# Patient Record
Sex: FEMALE | Race: WHITE | NOT HISPANIC OR LATINO | Employment: OTHER | URBAN - METROPOLITAN AREA
[De-identification: names, ages, dates, MRNs, and addresses within clinical notes are randomized per-mention and may not be internally consistent; named-entity substitution may affect disease eponyms.]

---

## 2017-01-06 DIAGNOSIS — B02.9 HERPES ZOSTER WITHOUT COMPLICATION: Primary | ICD-10-CM

## 2017-01-06 RX ORDER — FAMCICLOVIR 500 MG/1
500 TABLET ORAL 3 TIMES DAILY
Qty: 21 TABLET | Refills: 0 | Status: SHIPPED | OUTPATIENT
Start: 2017-01-06 | End: 2017-04-04

## 2017-01-10 ENCOUNTER — OFFICE VISIT (OUTPATIENT)
Dept: FAMILY MEDICINE CLINIC | Facility: CLINIC | Age: 82
End: 2017-01-10

## 2017-01-10 VITALS
WEIGHT: 130.6 LBS | RESPIRATION RATE: 16 BRPM | TEMPERATURE: 98.5 F | HEIGHT: 62 IN | SYSTOLIC BLOOD PRESSURE: 156 MMHG | HEART RATE: 78 BPM | DIASTOLIC BLOOD PRESSURE: 88 MMHG | OXYGEN SATURATION: 98 % | BODY MASS INDEX: 24.03 KG/M2

## 2017-01-10 DIAGNOSIS — I10 ESSENTIAL HYPERTENSION: ICD-10-CM

## 2017-01-10 DIAGNOSIS — R93.429 ABNORMAL CT SCAN, KIDNEY: ICD-10-CM

## 2017-01-10 DIAGNOSIS — B02.8 HERPES ZOSTER WITH OTHER COMPLICATION: ICD-10-CM

## 2017-01-10 DIAGNOSIS — E53.8 B12 DEFICIENCY: Primary | ICD-10-CM

## 2017-01-10 DIAGNOSIS — Z79.899 HIGH RISK MEDICATION USE: ICD-10-CM

## 2017-01-10 DIAGNOSIS — E07.9 DISEASE OF THYROID GLAND: ICD-10-CM

## 2017-01-10 DIAGNOSIS — F03.90 DEMENTIA WITHOUT BEHAVIORAL DISTURBANCE, UNSPECIFIED DEMENTIA TYPE: ICD-10-CM

## 2017-01-10 DIAGNOSIS — E04.2 GOITER, NONTOXIC, MULTINODULAR: ICD-10-CM

## 2017-01-10 DIAGNOSIS — R93.2 ABNORMAL LIVER CT: ICD-10-CM

## 2017-01-10 DIAGNOSIS — E11.9 CONTROLLED TYPE 2 DIABETES MELLITUS WITHOUT COMPLICATION, WITHOUT LONG-TERM CURRENT USE OF INSULIN (HCC): ICD-10-CM

## 2017-01-10 PROCEDURE — 99214 OFFICE O/P EST MOD 30 MIN: CPT | Performed by: FAMILY MEDICINE

## 2017-01-10 RX ORDER — GABAPENTIN 100 MG/1
100 CAPSULE ORAL 3 TIMES DAILY
Qty: 90 CAPSULE | Refills: 1 | Status: SHIPPED | OUTPATIENT
Start: 2017-01-10 | End: 2017-01-18 | Stop reason: SDUPTHER

## 2017-01-10 RX ORDER — ACYCLOVIR 800 MG/1
TABLET ORAL
COMMUNITY
Start: 2017-01-04 | End: 2017-01-10 | Stop reason: ALTCHOICE

## 2017-01-10 RX ORDER — TRIAMCINOLONE ACETONIDE 0.25 MG/G
OINTMENT TOPICAL 2 TIMES DAILY
Qty: 80 G | Refills: 2 | Status: SHIPPED | OUTPATIENT
Start: 2017-01-10 | End: 2017-07-12

## 2017-01-10 RX ORDER — HYDROCODONE BITARTRATE AND ACETAMINOPHEN 5; 325 MG/1; MG/1
TABLET ORAL
COMMUNITY
Start: 2017-01-04 | End: 2017-01-18 | Stop reason: SDUPTHER

## 2017-01-10 NOTE — PROGRESS NOTES
HPI  Rose Degroot is a 81 y.o. female who is here for follow up especially after recent emergency room visit for shingles affecting her left lower trunk.  She was seen at [facility] emergency room and apparently treated for dehydration with IV fluids and sent home with a prescription for acyclovir.  Previously talked to her son in [place] and patient has obvious progressive dementia and even according to her friend she frequently forgets to take her medications etc.  Unfortunately information from [facility] is somewhat difficult to find in our current computer system though is probably available somewhere.  Was able to find CT scan results that did report some abnormalities in both the liver and kidneys possibly cysts.  Contrast enhanced CT scans or ultrasounds recommended.  Unfortunately difficult to verify if patient has remained on current medications.  Grandson apparently had recently been living with her but is unclear if he will be moving out.  At some point her son may be having removed to [place] and is looking for an assisted living situation.  Unfortunately cannot really locate emergency room lab tests so will retest especially since now has rehydrated.  Her friend reports significant pain related to her shingles and will start low-dose gabapentin and follow closely.  Also steroidal ointment for the very dry rash will be started.      Review of Systems   Constitutional: Positive for appetite change and chills.   Respiratory: Positive for shortness of breath.    Cardiovascular: Positive for chest pain.   Endocrine: Positive for polydipsia.   Musculoskeletal: Positive for back pain and myalgias.   Skin: Positive for rash.   Neurological: Positive for dizziness.         No past medical history on file.    No past surgical history on file.    No family history on file.    Social History     Social History   • Marital status:      Spouse name: N/A   • Number of children: N/A   • Years of education: N/A      Occupational History   • Not on file.     Social History Main Topics   • Smoking status: Not on file   • Smokeless tobacco: Not on file   • Alcohol use Not on file   • Drug use: Not on file   • Sexual activity: Not on file     Other Topics Concern   • Not on file     Social History Narrative         Physical Exam   Constitutional: She is oriented to person, place, and time. She appears well-developed and well-nourished. No distress.   HENT:   Head: Normocephalic.   Mouth/Throat: Oropharynx is clear and moist.   Eyes: Conjunctivae are normal. Pupils are equal, round, and reactive to light.   Neck: Neck supple. Thyromegaly present.   Cardiovascular: Normal rate and regular rhythm.    Pulmonary/Chest: Effort normal and breath sounds normal.   Abdominal: Soft. She exhibits no distension and no mass. There is no tenderness.   Neurological: She is alert and oriented to person, place, and time.   Skin: Skin is warm and dry. Rash noted.        Psychiatric: She has a normal mood and affect. Her speech is normal and behavior is normal. She is not actively hallucinating. Cognition and memory are impaired.   Nursing note and vitals reviewed.        Assessment/Plan    Rose was seen today for herpes zoster.    Diagnoses and all orders for this visit:    B12 deficiency  -     CBC & Differential  -     Vitamin B12    Essential hypertension  -     Comprehensive Metabolic Panel    Disease of thyroid gland  -     TSH+Free T4    Controlled type 2 diabetes mellitus without complication, without long-term current use of insulin  -     Hemoglobin A1c    High risk medication use  -     Comprehensive Metabolic Panel    Goiter, nontoxic, multinodular    Abnormal liver CT  -     CT Abdomen Pelvis With Contrast; Future    Abnormal CT scan, kidney  -     CT Abdomen Pelvis With Contrast; Future    Dementia without behavioral disturbance, unspecified dementia type    Herpes zoster with other complication  -     gabapentin (NEURONTIN) 100 MG  capsule; Take 1 capsule by mouth 3 (Three) Times a Day.  -     triamcinolone (KENALOG) 0.025 % ointment; Apply  topically 2 (Two) Times a Day.     patient is mostly here because of recent emergency room visit for acute shingles outbreak.  Patient seems in no distress and denies complaint but her friend says she complains of significant pain at her home?  Multiple ongoing medical issues complicated type patient's apparent slowly progressive dementia.  It is unclear exactly what medication patient is taking at this time.  She most likely is reaching a point where she needs some assistance with her living but she is very resistant to any changes.  Will get baseline lab as noted.  Medication for her shingles as noted above.  Follow-up CT scan of abdomen and pelvis as recommended by emergency room non-contrast CT showing liver and kidney abnormalities?  Friend is to try to help clarify current medications.  Reports poor appetite and somewhat concerned about continuing Amaryl related to risks of low blood sugars?  Will await lab etc.    This note includes information entered using a voice recognition dictation system.  Though reviewed, some nonsensible errors may remain.

## 2017-01-10 NOTE — MR AVS SNAPSHOT
Rose Degroot   1/10/2017 2:40 PM   Office Visit    Dept Phone:  [phone]   Encounter #:  [id number]    Provider:  Jacob Bains MD   Department:  [facility]                Your Full Care Plan              Today's Medication Changes          These changes are accurate as of: 1/10/17  3:36 PM.  If you have any questions, ask your nurse or doctor.               Stop taking medication(s)listed here:     acyclovir 800 MG tablet   Commonly known as:  ZOVIRAX   Stopped by:  Jacob Bains MD                      Your Updated Medication List          This list is accurate as of: 1/10/17  3:36 PM.  Always use your most recent med list.                B-12 1000 MCG capsule       CALCIUM 500 + D 500-125 MG-UNIT tablet   Generic drug:  Calcium Carbonate-Vitamin D       famciclovir 500 MG tablet   Commonly known as:  FAMVIR   Take 1 tablet by mouth 3 (Three) Times a Day.       fish oil 1000 MG capsule capsule       glimepiride 2 MG tablet   Commonly known as:  AMARYL   Take 1 tablet by mouth Daily.       HYDROcodone-acetaminophen 5-325 MG per tablet   Commonly known as:  NORCO       lisinopril 20 MG tablet   Commonly known as:  PRINIVIL,ZESTRIL   Take 1 tablet by mouth daily.       MULTI VITAMIN DAILY PO       simvastatin 40 MG tablet   Commonly known as:  ZOCOR   Take 1 tablet by mouth daily.               We Performed the Following     CBC & Differential     Comprehensive Metabolic Panel     Hemoglobin A1c     TSH+Free T4     Vitamin B12       You Were Diagnosed With        Codes Comments    B12 deficiency    -  Primary ICD-10-CM: E53.8  ICD-9-CM: 266.2     Essential hypertension     ICD-10-CM: I10  ICD-9-CM: 401.9     Disease of thyroid gland     ICD-10-CM: E07.9  ICD-9-CM: 246.9     Controlled type 2 diabetes mellitus without complication, without long-term current use of insulin     ICD-10-CM: E11.9  ICD-9-CM: 250.00     High risk medication use     " ICD-10-CM: Z79.899  ICD-9-CM: V58.69     Goiter, nontoxic, multinodular     ICD-10-CM: E04.2  ICD-9-CM: 241.1     Abnormal liver CT     ICD-10-CM: R93.2  ICD-9-CM: 793.3       Instructions     None    Patient Instructions History      Upcoming Appointments     Visit Type Date Time Department    OFFICE VISIT 1/10/2017  2:40 PM [facility]    OFFICE VISIT 2017 10:20 AM [facility] Signup     [email or web address] allows you to send messages to your doctor, view your test results, renew your prescriptions, schedule appointments, and more. To sign up, go to [employer] and click on the Sign Up Now link in the New User? box. Enter your [employer] Activation Code exactly as it appears below along with the last four digits of your Social Security Number and your Date of Birth () to complete the sign-up process. If you do not sign up before the expiration date, you must request a new code.    [employer] Activation Code: [email or web address]  Expires: 2017  3:35 PM    If you have questions, you can email [email or web address] or call [phone] to talk to our [employer] staff. Remember, [employer] is NOT to be used for urgent needs. For medical emergencies, dial 911.               Other Info from Your Visit           Your Appointments     2017 10:20 AM EST   Office Visit with Jacob Bains MD   [facility] (--)    [address] [place] [id number] [phone]           Arrive 15 minutes prior to appointment.              Allergies     Sulfa Antibiotics        Reason for Visit     Herpes Zoster dx shingles R side of abdomin at [facility] last week.      Vital Signs     Blood Pressure Pulse Temperature Respirations Height Weight    156/88 78 98.5 °F (36.9 °C) 16 62\" (157.5 cm) 130 lb 9.6 oz (59.2 kg)    Oxygen Saturation Body Mass Index                98% 23.89 kg/m2          Problems and Diagnoses " Noted     Vitamin B12 deficiency    High blood pressure    Disease of thyroid gland    Goiter, nontoxic, multinodular    Controlled type 2 diabetes mellitus without complication, without long-term current use of insulin        High risk medication use        Abnormal liver CT

## 2017-01-11 LAB
ALBUMIN SERPL-MCNC: 4.3 G/DL (ref 3.5–4.8)
ALBUMIN/GLOB SERPL: 1.2 {RATIO} (ref 1.1–2.5)
ALP SERPL-CCNC: 62 IU/L (ref 39–117)
ALT SERPL-CCNC: 20 IU/L (ref 0–32)
AST SERPL-CCNC: 27 IU/L (ref 0–40)
BASOPHILS # BLD AUTO: 0 X10E3/UL (ref 0–0.2)
BASOPHILS NFR BLD AUTO: 0 %
BILIRUB SERPL-MCNC: 0.5 MG/DL (ref 0–1.2)
BUN SERPL-MCNC: 15 MG/DL (ref 8–27)
BUN/CREAT SERPL: 21 (ref 11–26)
CALCIUM SERPL-MCNC: 9.1 MG/DL (ref 8.7–10.3)
CHLORIDE SERPL-SCNC: 102 MMOL/L (ref 96–106)
CO2 SERPL-SCNC: 25 MMOL/L (ref 18–29)
CREAT SERPL-MCNC: 0.72 MG/DL (ref 0.57–1)
EOSINOPHIL # BLD AUTO: 0.1 X10E3/UL (ref 0–0.4)
EOSINOPHIL NFR BLD AUTO: 1 %
ERYTHROCYTE [DISTWIDTH] IN BLOOD BY AUTOMATED COUNT: 13.5 % (ref 12.3–15.4)
GLOBULIN SER CALC-MCNC: 3.6 G/DL (ref 1.5–4.5)
GLUCOSE SERPL-MCNC: 139 MG/DL (ref 65–99)
HBA1C MFR BLD: 6.8 % (ref 4.8–5.6)
HCT VFR BLD AUTO: 35.9 % (ref 34–46.6)
HGB BLD-MCNC: 12.2 G/DL (ref 11.1–15.9)
IMM GRANULOCYTES # BLD: 0 X10E3/UL (ref 0–0.1)
IMM GRANULOCYTES NFR BLD: 0 %
LYMPHOCYTES # BLD AUTO: 2 X10E3/UL (ref 0.7–3.1)
LYMPHOCYTES NFR BLD AUTO: 36 %
MCH RBC QN AUTO: 29.1 PG (ref 26.6–33)
MCHC RBC AUTO-ENTMCNC: 34 G/DL (ref 31.5–35.7)
MCV RBC AUTO: 86 FL (ref 79–97)
MONOCYTES # BLD AUTO: 0.3 X10E3/UL (ref 0.1–0.9)
MONOCYTES NFR BLD AUTO: 5 %
NEUTROPHILS # BLD AUTO: 3.1 X10E3/UL (ref 1.4–7)
NEUTROPHILS NFR BLD AUTO: 58 %
PLATELET # BLD AUTO: 331 X10E3/UL (ref 150–379)
POTASSIUM SERPL-SCNC: 5.1 MMOL/L (ref 3.5–5.2)
PROT SERPL-MCNC: 7.9 G/DL (ref 6–8.5)
RBC # BLD AUTO: 4.19 X10E6/UL (ref 3.77–5.28)
SODIUM SERPL-SCNC: 141 MMOL/L (ref 134–144)
T4 FREE SERPL-MCNC: 1.75 NG/DL (ref 0.82–1.77)
TSH SERPL DL<=0.005 MIU/L-ACNC: 0.85 UIU/ML (ref 0.45–4.5)
VIT B12 SERPL-MCNC: 696 PG/ML (ref 211–946)
WBC # BLD AUTO: 5.5 X10E3/UL (ref 3.4–10.8)

## 2017-01-12 RX ORDER — LISINOPRIL 20 MG/1
20 TABLET ORAL DAILY
Qty: 30 TABLET | Refills: 5 | Status: SHIPPED | OUTPATIENT
Start: 2017-01-12 | End: 2017-07-12 | Stop reason: SDUPTHER

## 2017-01-17 ENCOUNTER — TELEPHONE (OUTPATIENT)
Dept: FAMILY MEDICINE CLINIC | Facility: CLINIC | Age: 82
End: 2017-01-17

## 2017-01-18 DIAGNOSIS — B02.8 HERPES ZOSTER WITH OTHER COMPLICATION: ICD-10-CM

## 2017-01-18 RX ORDER — GABAPENTIN 100 MG/1
300 CAPSULE ORAL 3 TIMES DAILY
Qty: 90 CAPSULE | Refills: 1
Start: 2017-01-18 | End: 2017-03-08 | Stop reason: SDUPTHER

## 2017-01-18 RX ORDER — HYDROCODONE BITARTRATE AND ACETAMINOPHEN 5; 325 MG/1; MG/1
1 TABLET ORAL EVERY 4 HOURS PRN
Qty: 50 TABLET | Refills: 0 | Status: SHIPPED | OUTPATIENT
Start: 2017-01-18 | End: 2017-02-14

## 2017-01-25 DIAGNOSIS — B02.29 POSTHERPETIC NEURALGIA: Primary | ICD-10-CM

## 2017-01-25 RX ORDER — AMITRIPTYLINE HYDROCHLORIDE 10 MG/1
10 TABLET, FILM COATED ORAL NIGHTLY
Qty: 30 TABLET | Refills: 2 | Status: SHIPPED | OUTPATIENT
Start: 2017-01-25 | End: 2019-05-24

## 2017-01-26 ENCOUNTER — TELEPHONE (OUTPATIENT)
Dept: FAMILY MEDICINE CLINIC | Facility: CLINIC | Age: 82
End: 2017-01-26

## 2017-01-26 NOTE — TELEPHONE ENCOUNTER
----- Message from Jacob Bains MD sent at 1/26/2017  1:24 PM EST -----  Left message for patient that attempted to call back yesterday but apparently was given the wrong phone number.  Ended up talking to the patient and informed her I would send a prescription to her pharmacy last night.  Left message at the number below that prescription was sent last night for a different medication for the pain as well as a cream to maybe help with her persistent pain.  ----- Message -----     From: Daiana Sahu     Sent: 1/26/2017   9:58 AM       To: Jacob Bains MD    Patient's son said he called yesterday but i do not see a message. He said she just took her last Gabapentin, do you want her to continue? If so, please call into local pharm    Toi [phone]

## 2017-02-14 ENCOUNTER — OFFICE VISIT (OUTPATIENT)
Dept: FAMILY MEDICINE CLINIC | Facility: CLINIC | Age: 82
End: 2017-02-14

## 2017-02-14 VITALS
SYSTOLIC BLOOD PRESSURE: 160 MMHG | BODY MASS INDEX: 23.96 KG/M2 | TEMPERATURE: 97.9 F | HEART RATE: 79 BPM | RESPIRATION RATE: 16 BRPM | HEIGHT: 62 IN | OXYGEN SATURATION: 99 % | DIASTOLIC BLOOD PRESSURE: 92 MMHG | WEIGHT: 130.2 LBS

## 2017-02-14 DIAGNOSIS — I10 ESSENTIAL HYPERTENSION: ICD-10-CM

## 2017-02-14 DIAGNOSIS — B02.9 HERPES ZOSTER WITHOUT COMPLICATION: ICD-10-CM

## 2017-02-14 DIAGNOSIS — F02.80 LATE ONSET ALZHEIMER'S DISEASE WITHOUT BEHAVIORAL DISTURBANCE (HCC): Primary | ICD-10-CM

## 2017-02-14 DIAGNOSIS — G30.1 LATE ONSET ALZHEIMER'S DISEASE WITHOUT BEHAVIORAL DISTURBANCE (HCC): Primary | ICD-10-CM

## 2017-02-14 PROCEDURE — 99214 OFFICE O/P EST MOD 30 MIN: CPT | Performed by: FAMILY MEDICINE

## 2017-02-14 RX ORDER — DONEPEZIL HYDROCHLORIDE 5 MG/1
5 TABLET, FILM COATED ORAL NIGHTLY
Qty: 30 TABLET | Refills: 0 | Status: SHIPPED | OUTPATIENT
Start: 2017-02-14 | End: 2017-04-04 | Stop reason: SDUPTHER

## 2017-02-14 NOTE — PROGRESS NOTES
HPI  Rose Degroot is a 81 y.o. female who is here for follow up especially after recent episode of shingles.  Her Alzheimer's continues to progress and currently grandson lives with her to try to help with her care.  Her son lives in [place] and at one point was trying to get her transferred to an Alzheimer's unit there.  Patient has obvious short-term memory deficit telling me the same fax multiple times.  According to the grandson she is fairly functional at home with his assistance.  She wants to drive but grandson indicates she has gotten lost several times.      Review of Systems   Constitutional: Positive for activity change, chills and fatigue.   HENT: Positive for hearing loss.    Gastrointestinal: Positive for abdominal pain.   Musculoskeletal: Positive for back pain.   Skin: Positive for rash.   Neurological: Positive for dizziness, weakness and light-headedness.   Psychiatric/Behavioral: Positive for agitation, confusion, decreased concentration, dysphoric mood and sleep disturbance. The patient is nervous/anxious.    All other systems reviewed and are negative.        No past medical history on file.    No past surgical history on file.    No family history on file.    Social History     Social History   • Marital status:      Spouse name: N/A   • Number of children: N/A   • Years of education: N/A     Occupational History   • Not on file.     Social History Main Topics   • Smoking status: Not on file   • Smokeless tobacco: Not on file   • Alcohol use Not on file   • Drug use: Not on file   • Sexual activity: Not on file     Other Topics Concern   • Not on file     Social History Narrative         Physical Exam   Constitutional: She is oriented to person, place, and time. She appears well-developed and well-nourished. No distress.   HENT:   Head: Normocephalic.   Mouth/Throat: Oropharynx is clear and moist.   Eyes: Conjunctivae are normal. Pupils are equal, round, and reactive to light.   Neck:  Neck supple. No thyromegaly present.   Cardiovascular: Normal rate and regular rhythm.    Pulmonary/Chest: Effort normal. No respiratory distress.   Abdominal: Soft. She exhibits no distension.   Musculoskeletal: She exhibits no edema or deformity.   Neurological: She is alert and oriented to person, place, and time. She exhibits normal muscle tone. Coordination normal.   Skin: Skin is warm and dry. Rash noted.        Nursing note and vitals reviewed.        Assessment/Plan    Rose was seen today for herpes zoster and dementia.    Diagnoses and all orders for this visit:    Late onset Alzheimer's disease without behavioral disturbance    Essential hypertension    Herpes zoster without complication    Other orders  -     donepezil (ARICEPT) 5 MG tablet; Take 1 tablet by mouth Every Night.      Patient here for recent outbreak of shingles which seems to be resolving and grandson says for first time reported aching but no significant pain.  Major concern is patient's obviously progressive dementia with extreme short-term memory loss.  For now grandson is living at her house help with her care.  Discussed treatment options specifically medication may slow progression.  Will start Aricept and recheck in one month to increase dose as tolerated.  Blood pressure remains borderline.  Recent lab tests obtained and all reviewed, unremarkable.    This note includes information entered using a voice recognition dictation system.  Though reviewed, some nonsensible errors may remain.

## 2017-03-08 DIAGNOSIS — B02.8 HERPES ZOSTER WITH OTHER COMPLICATION: ICD-10-CM

## 2017-03-08 RX ORDER — GABAPENTIN 100 MG/1
300 CAPSULE ORAL 3 TIMES DAILY
Qty: 90 CAPSULE | Refills: 1
Start: 2017-03-08 | End: 2017-07-12

## 2017-04-04 ENCOUNTER — OFFICE VISIT (OUTPATIENT)
Dept: FAMILY MEDICINE CLINIC | Facility: CLINIC | Age: 82
End: 2017-04-04

## 2017-04-04 VITALS
DIASTOLIC BLOOD PRESSURE: 76 MMHG | TEMPERATURE: 97.7 F | SYSTOLIC BLOOD PRESSURE: 134 MMHG | HEIGHT: 62 IN | WEIGHT: 126 LBS | OXYGEN SATURATION: 99 % | HEART RATE: 85 BPM | BODY MASS INDEX: 23.19 KG/M2

## 2017-04-04 DIAGNOSIS — G30.1 LATE ONSET ALZHEIMER'S DISEASE WITHOUT BEHAVIORAL DISTURBANCE (HCC): ICD-10-CM

## 2017-04-04 DIAGNOSIS — I10 ESSENTIAL HYPERTENSION: Primary | ICD-10-CM

## 2017-04-04 DIAGNOSIS — E04.2 GOITER, NONTOXIC, MULTINODULAR: ICD-10-CM

## 2017-04-04 DIAGNOSIS — E07.9 DISEASE OF THYROID GLAND: ICD-10-CM

## 2017-04-04 DIAGNOSIS — E78.5 HYPERLIPIDEMIA, UNSPECIFIED HYPERLIPIDEMIA TYPE: ICD-10-CM

## 2017-04-04 DIAGNOSIS — M54.16 LUMBAR RADICULOPATHY: ICD-10-CM

## 2017-04-04 DIAGNOSIS — E53.8 B12 DEFICIENCY: ICD-10-CM

## 2017-04-04 DIAGNOSIS — F02.80 LATE ONSET ALZHEIMER'S DISEASE WITHOUT BEHAVIORAL DISTURBANCE (HCC): ICD-10-CM

## 2017-04-04 PROCEDURE — 99213 OFFICE O/P EST LOW 20 MIN: CPT | Performed by: FAMILY MEDICINE

## 2017-04-04 RX ORDER — DONEPEZIL HYDROCHLORIDE 10 MG/1
10 TABLET, FILM COATED ORAL NIGHTLY
Qty: 30 TABLET | Refills: 5 | Status: SHIPPED | OUTPATIENT
Start: 2017-04-04 | End: 2017-07-12 | Stop reason: SDUPTHER

## 2017-04-04 NOTE — PROGRESS NOTES
MICHELE Degroot is a 81 y.o. female who is here for follow up of recent episode of shingles.  Rash has totally resolved and some hyperpigmented areas noted only.  Though difficult to evaluate seems that pain has significantly decreased.  Patient has obvious dementia repeating same statements several times during the visit.  Does report living at home with a grandson.  No longer drives.  Seems to be making rational decisions.  Reports does need refill on medications.  Assume remains on Aricept which will be continued though increase dose to 10 mg.      Review of Systems   Constitutional: Positive for fatigue.   HENT: Positive for hearing loss.    Musculoskeletal: Positive for back pain.   Psychiatric/Behavioral: Positive for decreased concentration.   All other systems reviewed and are negative.        No past medical history on file.    No past surgical history on file.    No family history on file.    Social History     Social History   • Marital status:      Spouse name: N/A   • Number of children: N/A   • Years of education: N/A     Occupational History   • Not on file.     Social History Main Topics   • Smoking status: Not on file   • Smokeless tobacco: Not on file   • Alcohol use Not on file   • Drug use: Not on file   • Sexual activity: Not on file     Other Topics Concern   • Not on file     Social History Narrative         Physical Exam   Constitutional: She is oriented to person, place, and time. She appears well-developed and well-nourished.   HENT:   Head: Normocephalic.   Nose: Nose normal.   Mouth/Throat: Oropharynx is clear and moist.   Eyes: Conjunctivae are normal. Pupils are equal, round, and reactive to light.   Neck: Neck supple. Thyromegaly present.   Cardiovascular: Regular rhythm.    Pulmonary/Chest: Effort normal. No respiratory distress. She has no wheezes.   Abdominal: Soft. She exhibits no distension. There is no tenderness.   Musculoskeletal: She exhibits no edema or deformity.    Neurological: She is alert and oriented to person, place, and time. She exhibits normal muscle tone. Coordination normal.   Skin: Skin is warm and dry.   Hyperpigmented areas remain from previous shingles   Psychiatric: She has a normal mood and affect. Her speech is normal and behavior is normal. She is not actively hallucinating. Cognition and memory are impaired. She exhibits abnormal recent memory. She is attentive.   Nursing note and vitals reviewed.        Assessment/Plan    Rose was seen today for follow-up.    Diagnoses and all orders for this visit:    Essential hypertension    Hyperlipidemia, unspecified hyperlipidemia type    Goiter, nontoxic, multinodular    Disease of thyroid gland    B12 deficiency    Late onset Alzheimer's disease without behavioral disturbance    Lumbar radiculopathy    Other orders  -     donepezil (ARICEPT) 10 MG tablet; Take 1 tablet by mouth Every Night.          Patient is here especially for follow-up of Alzheimer's dementia.  Overall status seems to be fairly stable on current regimen.  Will increase Aricept to 10 mg at bedtime and follow-up in 3 months.  Also continue current medication for hypertension, diabetes and thyroid disorder.  Recheck in 3 months or as needed.    This note includes information entered using a voice recognition dictation system.  Though reviewed, some nonsensible errors may remain.

## 2017-07-12 ENCOUNTER — OFFICE VISIT (OUTPATIENT)
Dept: FAMILY MEDICINE CLINIC | Facility: CLINIC | Age: 82
End: 2017-07-12

## 2017-07-12 VITALS
HEIGHT: 62 IN | DIASTOLIC BLOOD PRESSURE: 82 MMHG | BODY MASS INDEX: 23.19 KG/M2 | OXYGEN SATURATION: 98 % | HEART RATE: 78 BPM | TEMPERATURE: 98 F | WEIGHT: 126 LBS | SYSTOLIC BLOOD PRESSURE: 132 MMHG

## 2017-07-12 DIAGNOSIS — I10 ESSENTIAL HYPERTENSION: ICD-10-CM

## 2017-07-12 DIAGNOSIS — G30.1 LATE ONSET ALZHEIMER'S DISEASE WITHOUT BEHAVIORAL DISTURBANCE (HCC): Primary | ICD-10-CM

## 2017-07-12 DIAGNOSIS — F02.80 LATE ONSET ALZHEIMER'S DISEASE WITHOUT BEHAVIORAL DISTURBANCE (HCC): Primary | ICD-10-CM

## 2017-07-12 DIAGNOSIS — E53.8 B12 DEFICIENCY: ICD-10-CM

## 2017-07-12 DIAGNOSIS — E11.9 CONTROLLED TYPE 2 DIABETES MELLITUS WITHOUT COMPLICATION, WITHOUT LONG-TERM CURRENT USE OF INSULIN (HCC): ICD-10-CM

## 2017-07-12 DIAGNOSIS — Z79.899 HIGH RISK MEDICATION USE: ICD-10-CM

## 2017-07-12 LAB
ALBUMIN SERPL-MCNC: 4.2 G/DL (ref 3.5–5.2)
ALBUMIN/GLOB SERPL: 1.2 G/DL
ALP SERPL-CCNC: 68 U/L (ref 39–117)
ALT SERPL-CCNC: 16 U/L (ref 1–33)
AST SERPL-CCNC: 20 U/L (ref 1–32)
BASOPHILS # BLD AUTO: 0.02 10*3/MM3 (ref 0–0.2)
BASOPHILS NFR BLD AUTO: 0.3 % (ref 0–1.5)
BILIRUB SERPL-MCNC: 0.5 MG/DL (ref 0.1–1.2)
BUN SERPL-MCNC: 18 MG/DL (ref 8–23)
BUN/CREAT SERPL: 22.8 (ref 7–25)
CALCIUM SERPL-MCNC: 8.9 MG/DL (ref 8.6–10.5)
CHLORIDE SERPL-SCNC: 103 MMOL/L (ref 98–107)
CO2 SERPL-SCNC: 22.1 MMOL/L (ref 22–29)
CREAT SERPL-MCNC: 0.79 MG/DL (ref 0.57–1)
EOSINOPHIL # BLD AUTO: 0.1 10*3/MM3 (ref 0–0.7)
EOSINOPHIL NFR BLD AUTO: 1.5 % (ref 0.3–6.2)
ERYTHROCYTE [DISTWIDTH] IN BLOOD BY AUTOMATED COUNT: 13 % (ref 11.7–13)
GLOBULIN SER CALC-MCNC: 3.4 GM/DL
GLUCOSE SERPL-MCNC: 126 MG/DL (ref 65–99)
HBA1C MFR BLD: 6.16 % (ref 4.8–5.6)
HCT VFR BLD AUTO: 37.4 % (ref 35.6–45.5)
HGB BLD-MCNC: 12.6 G/DL (ref 11.9–15.5)
IMM GRANULOCYTES # BLD: 0 10*3/MM3 (ref 0–0.03)
IMM GRANULOCYTES NFR BLD: 0 % (ref 0–0.5)
LYMPHOCYTES # BLD AUTO: 1.95 10*3/MM3 (ref 0.9–4.8)
LYMPHOCYTES NFR BLD AUTO: 29.1 % (ref 19.6–45.3)
MCH RBC QN AUTO: 30 PG (ref 26.9–32)
MCHC RBC AUTO-ENTMCNC: 33.7 G/DL (ref 32.4–36.3)
MCV RBC AUTO: 89 FL (ref 80.5–98.2)
MONOCYTES # BLD AUTO: 0.33 10*3/MM3 (ref 0.2–1.2)
MONOCYTES NFR BLD AUTO: 4.9 % (ref 5–12)
NEUTROPHILS # BLD AUTO: 4.31 10*3/MM3 (ref 1.9–8.1)
NEUTROPHILS NFR BLD AUTO: 64.2 % (ref 42.7–76)
PLATELET # BLD AUTO: 257 10*3/MM3 (ref 140–500)
POTASSIUM SERPL-SCNC: 4.1 MMOL/L (ref 3.5–5.2)
PROT SERPL-MCNC: 7.6 G/DL (ref 6–8.5)
RBC # BLD AUTO: 4.2 10*6/MM3 (ref 3.9–5.2)
SODIUM SERPL-SCNC: 140 MMOL/L (ref 136–145)
WBC # BLD AUTO: 6.71 10*3/MM3 (ref 4.5–10.7)

## 2017-07-12 PROCEDURE — 99213 OFFICE O/P EST LOW 20 MIN: CPT | Performed by: FAMILY MEDICINE

## 2017-07-12 RX ORDER — DONEPEZIL HYDROCHLORIDE 10 MG/1
10 TABLET, FILM COATED ORAL NIGHTLY
Qty: 30 TABLET | Refills: 5 | Status: SHIPPED | OUTPATIENT
Start: 2017-07-12 | End: 2019-05-24

## 2017-07-12 RX ORDER — LISINOPRIL 20 MG/1
20 TABLET ORAL DAILY
Qty: 30 TABLET | Refills: 5 | Status: SHIPPED | OUTPATIENT
Start: 2017-07-12 | End: 2018-12-11 | Stop reason: ALTCHOICE

## 2017-07-12 NOTE — PROGRESS NOTES
HPI  Rose Degroot is a 81 y.o. female who is here for follow up of slowly progressive Alzheimer's dementia.  Lives with her grandson and no longer drives etc.  Patient denies any new complaints.  Grandson reports basically no significant change in status probable slow worsening especially of memory.  History of shingles and seems to finally be resolving from postherpetic neuralgia.  Discussed with grandson consideration of additional medication for Alzheimer's however benefits questionable at this time.  Will reevaluate in 3 months.      Review of Systems   Musculoskeletal: Positive for back pain.   Psychiatric/Behavioral: Positive for decreased concentration.         No past medical history on file.    No past surgical history on file.    No family history on file.    Social History     Social History   • Marital status:      Spouse name: N/A   • Number of children: N/A   • Years of education: N/A     Occupational History   • Not on file.     Social History Main Topics   • Smoking status: Not on file   • Smokeless tobacco: Not on file   • Alcohol use Not on file   • Drug use: Not on file   • Sexual activity: Not on file     Other Topics Concern   • Not on file     Social History Narrative         Physical Exam   Constitutional: She is oriented to person, place, and time. She appears well-developed and well-nourished. No distress.   HENT:   Head: Normocephalic.   Mouth/Throat: Oropharynx is clear and moist.   Eyes: Conjunctivae are normal. Pupils are equal, round, and reactive to light.   Neck: Normal range of motion. Neck supple. No JVD present. No thyromegaly present.   Cardiovascular: Normal rate, regular rhythm and normal heart sounds.    Pulmonary/Chest: Effort normal and breath sounds normal.   Abdominal: Soft. Bowel sounds are normal. There is no tenderness.   Musculoskeletal: She exhibits no edema or deformity.   Neurological: She is alert and oriented to person, place, and time. She exhibits normal  muscle tone. Coordination normal.   Skin: Skin is dry.   Psychiatric: She has a normal mood and affect. Her behavior is normal. Judgment and thought content normal.   Nursing note and vitals reviewed.        Assessment/Plan    Rose was seen today for follow-up, hypertension, alzheimer's disease and med refill.    Diagnoses and all orders for this visit:    Late onset Alzheimer's disease without behavioral disturbance    B12 deficiency  -     CBC & Differential    High risk medication use  -     CBC & Differential  -     Comprehensive Metabolic Panel    Controlled type 2 diabetes mellitus without complication, without long-term current use of insulin  -     Hemoglobin A1c    Essential hypertension    Other orders  -     lisinopril (PRINIVIL,ZESTRIL) 20 MG tablet; Take 1 tablet by mouth Daily.  -     donepezil (ARICEPT) 10 MG tablet; Take 1 tablet by mouth Every Night.      Patient here for routine follow-up of above-noted medical problems.  Overall status is very stable except for slowly progressive dementia most likely related to Alzheimer's.  Will get routine lab work as noted above.  Follow-up in 3 months and again at some point consider additional dementia medication?    This note includes information entered using a voice recognition dictation system.  Though reviewed, some nonsensible errors may remain.

## 2018-01-09 ENCOUNTER — OFFICE VISIT (OUTPATIENT)
Dept: FAMILY MEDICINE CLINIC | Facility: CLINIC | Age: 83
End: 2018-01-09

## 2018-01-09 VITALS
HEART RATE: 67 BPM | DIASTOLIC BLOOD PRESSURE: 80 MMHG | RESPIRATION RATE: 16 BRPM | SYSTOLIC BLOOD PRESSURE: 180 MMHG | HEIGHT: 62 IN | TEMPERATURE: 97.8 F | WEIGHT: 122.6 LBS | BODY MASS INDEX: 22.56 KG/M2 | OXYGEN SATURATION: 98 %

## 2018-01-09 DIAGNOSIS — I10 ESSENTIAL HYPERTENSION: Primary | ICD-10-CM

## 2018-01-09 DIAGNOSIS — F02.80 LATE ONSET ALZHEIMER'S DISEASE WITHOUT BEHAVIORAL DISTURBANCE (HCC): ICD-10-CM

## 2018-01-09 DIAGNOSIS — R73.9 HYPERGLYCEMIA: ICD-10-CM

## 2018-01-09 DIAGNOSIS — Z79.899 HIGH RISK MEDICATION USE: ICD-10-CM

## 2018-01-09 DIAGNOSIS — G30.1 LATE ONSET ALZHEIMER'S DISEASE WITHOUT BEHAVIORAL DISTURBANCE (HCC): ICD-10-CM

## 2018-01-09 DIAGNOSIS — E53.8 B12 DEFICIENCY: ICD-10-CM

## 2018-01-09 PROCEDURE — 99213 OFFICE O/P EST LOW 20 MIN: CPT | Performed by: FAMILY MEDICINE

## 2018-01-09 NOTE — PROGRESS NOTES
HPI  Rose Degroot is a 82 y.o. female who is here for follow up of hypertension and Alzheimer's dementia.  Has obvious memory impairments and apparently continues to live with grandson who helps with her care.  Asks same questions multiple times etc.  Is no longer driving and asks several time about going into the hospital and informed that I no longer would be her doctor in the hospital etc.  Recommend going to closest available hospital etc.  The pressure is slightly elevated but patient is obviously excited and expect this is a major factor.      Review of Systems   Musculoskeletal:        No longer complaining of back pain   Psychiatric/Behavioral: Positive for confusion.   All other systems reviewed and are negative.        No past medical history on file.    No past surgical history on file.    No family history on file.    Social History     Social History   • Marital status:      Spouse name: N/A   • Number of children: N/A   • Years of education: N/A     Occupational History   • Not on file.     Social History Main Topics   • Smoking status: Not on file   • Smokeless tobacco: Not on file   • Alcohol use Not on file   • Drug use: Not on file   • Sexual activity: Not on file     Other Topics Concern   • Not on file     Social History Narrative         Physical Exam   Constitutional: She is oriented to person, place, and time. She appears well-developed and well-nourished. No distress.   HENT:   Head: Normocephalic.   Mouth/Throat: Oropharynx is clear and moist.   Eyes: Conjunctivae and EOM are normal. Pupils are equal, round, and reactive to light.   Neck: Normal range of motion. Neck supple. No JVD present. No thyromegaly present.   Cardiovascular: Normal rate, regular rhythm and normal heart sounds.    Pulmonary/Chest: Effort normal. No respiratory distress. She has no wheezes. She has no rales.   Abdominal: Soft. She exhibits no distension and no mass. There is no tenderness.   Musculoskeletal: She  exhibits no edema or deformity.   Neurological: She is alert and oriented to person, place, and time. Coordination normal.   Skin: Skin is warm and dry.   Psychiatric: Her speech is normal and behavior is normal. Her mood appears anxious. She is not actively hallucinating. She exhibits abnormal recent memory. She is attentive.   Nursing note and vitals reviewed.        Assessment/Plan    Rose was seen today for hypertension.    Diagnoses and all orders for this visit:    Essential hypertension  -     Comprehensive Metabolic Panel    Late onset Alzheimer's disease without behavioral disturbance    B12 deficiency  -     CBC & Differential  -     Vitamin B12    High risk medication use  -     Comprehensive Metabolic Panel  -     CBC & Differential    Hyperglycemia  -     Hemoglobin A1c    Patient is here for routine follow-up especially of Alzheimer's dementia.  Continues with significant short-term memory deficit.  Apparently continues to live with her grandson.  Overall status seems fairly stable.  Blood pressure somewhat elevated but most likely related to general anxiety.  Overall status stable on current regimen which will be continued including follow-up every 6 months.  A1c levels have been borderline in the past and will be rechecked.    This note includes information entered using a voice recognition dictation system.  Though reviewed, some nonsensible errors may remain.

## 2018-01-10 LAB
ALBUMIN SERPL-MCNC: 4.4 G/DL (ref 3.5–5.2)
ALBUMIN/GLOB SERPL: 1.3 G/DL
ALP SERPL-CCNC: 80 U/L (ref 39–117)
ALT SERPL-CCNC: 12 U/L (ref 1–33)
AST SERPL-CCNC: 17 U/L (ref 1–32)
BASOPHILS # BLD AUTO: 0.03 10*3/MM3 (ref 0–0.2)
BASOPHILS NFR BLD AUTO: 0.6 % (ref 0–1.5)
BILIRUB SERPL-MCNC: 0.7 MG/DL (ref 0.1–1.2)
BUN SERPL-MCNC: 14 MG/DL (ref 8–23)
BUN/CREAT SERPL: 21.5 (ref 7–25)
CALCIUM SERPL-MCNC: 9.1 MG/DL (ref 8.6–10.5)
CHLORIDE SERPL-SCNC: 103 MMOL/L (ref 98–107)
CO2 SERPL-SCNC: 25.2 MMOL/L (ref 22–29)
CREAT SERPL-MCNC: 0.65 MG/DL (ref 0.57–1)
EOSINOPHIL # BLD AUTO: 0.08 10*3/MM3 (ref 0–0.7)
EOSINOPHIL NFR BLD AUTO: 1.5 % (ref 0.3–6.2)
ERYTHROCYTE [DISTWIDTH] IN BLOOD BY AUTOMATED COUNT: 13 % (ref 11.7–13)
GLOBULIN SER CALC-MCNC: 3.3 GM/DL
GLUCOSE SERPL-MCNC: 102 MG/DL (ref 65–99)
HBA1C MFR BLD: 5.89 % (ref 4.8–5.6)
HCT VFR BLD AUTO: 37.2 % (ref 35.6–45.5)
HGB BLD-MCNC: 12.4 G/DL (ref 11.9–15.5)
IMM GRANULOCYTES # BLD: 0 10*3/MM3 (ref 0–0.03)
IMM GRANULOCYTES NFR BLD: 0 % (ref 0–0.5)
LYMPHOCYTES # BLD AUTO: 1.83 10*3/MM3 (ref 0.9–4.8)
LYMPHOCYTES NFR BLD AUTO: 34.5 % (ref 19.6–45.3)
MCH RBC QN AUTO: 29.4 PG (ref 26.9–32)
MCHC RBC AUTO-ENTMCNC: 33.3 G/DL (ref 32.4–36.3)
MCV RBC AUTO: 88.2 FL (ref 80.5–98.2)
MONOCYTES # BLD AUTO: 0.33 10*3/MM3 (ref 0.2–1.2)
MONOCYTES NFR BLD AUTO: 6.2 % (ref 5–12)
NEUTROPHILS # BLD AUTO: 3.03 10*3/MM3 (ref 1.9–8.1)
NEUTROPHILS NFR BLD AUTO: 57.2 % (ref 42.7–76)
PLATELET # BLD AUTO: 264 10*3/MM3 (ref 140–500)
POTASSIUM SERPL-SCNC: 3.8 MMOL/L (ref 3.5–5.2)
PROT SERPL-MCNC: 7.7 G/DL (ref 6–8.5)
RBC # BLD AUTO: 4.22 10*6/MM3 (ref 3.9–5.2)
SODIUM SERPL-SCNC: 142 MMOL/L (ref 136–145)
VIT B12 SERPL-MCNC: 639 PG/ML (ref 211–946)
WBC # BLD AUTO: 5.3 10*3/MM3 (ref 4.5–10.7)

## 2018-12-11 ENCOUNTER — OFFICE VISIT (OUTPATIENT)
Dept: FAMILY MEDICINE CLINIC | Facility: CLINIC | Age: 83
End: 2018-12-11

## 2018-12-11 VITALS
HEART RATE: 94 BPM | BODY MASS INDEX: 24.62 KG/M2 | OXYGEN SATURATION: 98 % | HEIGHT: 62 IN | SYSTOLIC BLOOD PRESSURE: 190 MMHG | TEMPERATURE: 98.4 F | DIASTOLIC BLOOD PRESSURE: 90 MMHG | RESPIRATION RATE: 16 BRPM | WEIGHT: 133.8 LBS

## 2018-12-11 DIAGNOSIS — F02.80 LATE ONSET ALZHEIMER'S DISEASE WITHOUT BEHAVIORAL DISTURBANCE (HCC): ICD-10-CM

## 2018-12-11 DIAGNOSIS — Z79.899 HIGH RISK MEDICATION USE: ICD-10-CM

## 2018-12-11 DIAGNOSIS — E11.9 CONTROLLED TYPE 2 DIABETES MELLITUS WITHOUT COMPLICATION, WITHOUT LONG-TERM CURRENT USE OF INSULIN (HCC): ICD-10-CM

## 2018-12-11 DIAGNOSIS — I10 ESSENTIAL HYPERTENSION: Primary | ICD-10-CM

## 2018-12-11 DIAGNOSIS — G30.1 LATE ONSET ALZHEIMER'S DISEASE WITHOUT BEHAVIORAL DISTURBANCE (HCC): ICD-10-CM

## 2018-12-11 PROCEDURE — 99214 OFFICE O/P EST MOD 30 MIN: CPT | Performed by: FAMILY MEDICINE

## 2018-12-11 RX ORDER — LISINOPRIL AND HYDROCHLOROTHIAZIDE 20; 12.5 MG/1; MG/1
1 TABLET ORAL DAILY
Qty: 90 TABLET | Refills: 3 | Status: SHIPPED | OUTPATIENT
Start: 2018-12-11 | End: 2019-01-15 | Stop reason: ALTCHOICE

## 2018-12-11 NOTE — PROGRESS NOTES
HPI  Rose Degroot is a 83 y.o. female who is here for follow up of dementia and hypertension.  Patient lives with his grandson and his wife and children.  Reports significant stress.  Denies headaches or dizziness and in fact has is totally asymptomatic.  Patient asked same question multiple times despite being given answers.  Has obvious dementia.  Blood pressure is noted to be significantly elevated and will adjust blood pressure medication and recheck in one month      Review of Systems   Constitutional: Positive for unexpected weight change.   Respiratory: Negative for shortness of breath.    Cardiovascular: Negative for chest pain and leg swelling.   Psychiatric/Behavioral: Positive for confusion. The patient is nervous/anxious.    All other systems reviewed and are negative.        History reviewed. No pertinent past medical history.    History reviewed. No pertinent surgical history.    History reviewed. No pertinent family history.    Social History     Socioeconomic History   • Marital status:      Spouse name: Not on file   • Number of children: Not on file   • Years of education: Not on file   • Highest education level: Not on file   Social Needs   • Financial resource strain: Not on file   • Food insecurity - worry: Not on file   • Food insecurity - inability: Not on file   • Transportation needs - medical: Not on file   • Transportation needs - non-medical: Not on file   Occupational History   • Not on file   Tobacco Use   • Smoking status: Never Smoker   • Smokeless tobacco: Never Used   Substance and Sexual Activity   • Alcohol use: No     Frequency: Never   • Drug use: No   • Sexual activity: No   Other Topics Concern   • Not on file   Social History Narrative   • Not on file         Physical Exam   Constitutional: She is oriented to person, place, and time. She appears well-developed and well-nourished. No distress.   HENT:   Head: Normocephalic and atraumatic.   Nose: Nose normal.    Mouth/Throat: Oropharynx is clear and moist.   Eyes: Conjunctivae and EOM are normal. Pupils are equal, round, and reactive to light.   Neck: Normal range of motion. No JVD present. No thyromegaly present.   Cardiovascular: Normal rate, regular rhythm and normal heart sounds.   Pulmonary/Chest: Effort normal and breath sounds normal.   Abdominal: Soft.   Musculoskeletal: Normal range of motion. She exhibits no edema or deformity.   Neurological: She is alert and oriented to person, place, and time. Coordination normal.   Skin: Skin is warm and dry.   Psychiatric: She has a normal mood and affect. Her speech is normal and behavior is normal. She exhibits abnormal recent memory.   Nursing note and vitals reviewed.        Assessment/Plan    Rose was seen today for hypertension.    Diagnoses and all orders for this visit:    Essential hypertension  -     Comprehensive Metabolic Panel    Late onset Alzheimer's disease without behavioral disturbance    High risk medication use  -     CBC & Differential  -     Comprehensive Metabolic Panel    Controlled type 2 diabetes mellitus without complication, without long-term current use of insulin (CMS/[facility])  -     Hemoglobin A1c    Other orders  -     lisinopril-hydrochlorothiazide (ZESTORETIC) 20-12.5 MG per tablet; Take 1 tablet by mouth Daily.        Patient here for follow-up of above-noted medical problems.  Has obvious dementia most likely Alzheimer's and apparently remains on Aricept.  Overall neurological status seems fairly stable.  Again ask same question multiple times during the office visit despite giving answers each time ask.  In view of elevated blood pressure will add diuretic to ACE inhibitor and recheck blood pressure in one month.  Also routine lab work as noted above and recheck in one month.    This note includes information entered using a voice recognition dictation system.  Though reviewed, some nonsensible errors may remain.

## 2018-12-12 LAB
ALBUMIN SERPL-MCNC: 4.7 G/DL (ref 3.5–4.7)
ALBUMIN/GLOB SERPL: 1.4 {RATIO} (ref 1.2–2.2)
ALP SERPL-CCNC: 85 IU/L (ref 39–117)
ALT SERPL-CCNC: 50 IU/L (ref 0–32)
AST SERPL-CCNC: 47 IU/L (ref 0–40)
BASOPHILS # BLD AUTO: 0 X10E3/UL (ref 0–0.2)
BASOPHILS NFR BLD AUTO: 0 %
BILIRUB SERPL-MCNC: 0.4 MG/DL (ref 0–1.2)
BUN SERPL-MCNC: 11 MG/DL (ref 8–27)
BUN/CREAT SERPL: 14 (ref 12–28)
CALCIUM SERPL-MCNC: 9.4 MG/DL (ref 8.7–10.3)
CHLORIDE SERPL-SCNC: 104 MMOL/L (ref 96–106)
CO2 SERPL-SCNC: 19 MMOL/L (ref 20–29)
CREAT SERPL-MCNC: 0.8 MG/DL (ref 0.57–1)
EOSINOPHIL # BLD AUTO: 0.1 X10E3/UL (ref 0–0.4)
EOSINOPHIL NFR BLD AUTO: 1 %
ERYTHROCYTE [DISTWIDTH] IN BLOOD BY AUTOMATED COUNT: 13.7 % (ref 12.3–15.4)
GLOBULIN SER CALC-MCNC: 3.4 G/DL (ref 1.5–4.5)
GLUCOSE SERPL-MCNC: 127 MG/DL (ref 65–99)
HBA1C MFR BLD: 7 % (ref 4.8–5.6)
HCT VFR BLD AUTO: 40.6 % (ref 34–46.6)
HGB BLD-MCNC: 13.8 G/DL (ref 11.1–15.9)
IMM GRANULOCYTES # BLD: 0 X10E3/UL (ref 0–0.1)
IMM GRANULOCYTES NFR BLD: 0 %
LYMPHOCYTES # BLD AUTO: 2.6 X10E3/UL (ref 0.7–3.1)
LYMPHOCYTES NFR BLD AUTO: 39 %
MCH RBC QN AUTO: 29.2 PG (ref 26.6–33)
MCHC RBC AUTO-ENTMCNC: 34 G/DL (ref 31.5–35.7)
MCV RBC AUTO: 86 FL (ref 79–97)
MONOCYTES # BLD AUTO: 0.4 X10E3/UL (ref 0.1–0.9)
MONOCYTES NFR BLD AUTO: 6 %
NEUTROPHILS # BLD AUTO: 3.5 X10E3/UL (ref 1.4–7)
NEUTROPHILS NFR BLD AUTO: 54 %
PLATELET # BLD AUTO: 275 X10E3/UL (ref 150–379)
POTASSIUM SERPL-SCNC: 4.5 MMOL/L (ref 3.5–5.2)
PROT SERPL-MCNC: 8.1 G/DL (ref 6–8.5)
RBC # BLD AUTO: 4.72 X10E6/UL (ref 3.77–5.28)
SODIUM SERPL-SCNC: 140 MMOL/L (ref 134–144)
WBC # BLD AUTO: 6.6 X10E3/UL (ref 3.4–10.8)

## 2019-01-15 ENCOUNTER — OFFICE VISIT (OUTPATIENT)
Dept: FAMILY MEDICINE CLINIC | Facility: CLINIC | Age: 84
End: 2019-01-15

## 2019-01-15 VITALS
SYSTOLIC BLOOD PRESSURE: 170 MMHG | OXYGEN SATURATION: 98 % | DIASTOLIC BLOOD PRESSURE: 90 MMHG | HEART RATE: 87 BPM | HEIGHT: 62 IN | WEIGHT: 133.8 LBS | RESPIRATION RATE: 16 BRPM | TEMPERATURE: 97.8 F | BODY MASS INDEX: 24.62 KG/M2

## 2019-01-15 DIAGNOSIS — G30.1 LATE ONSET ALZHEIMER'S DISEASE WITHOUT BEHAVIORAL DISTURBANCE (HCC): Primary | ICD-10-CM

## 2019-01-15 DIAGNOSIS — F02.80 LATE ONSET ALZHEIMER'S DISEASE WITHOUT BEHAVIORAL DISTURBANCE (HCC): Primary | ICD-10-CM

## 2019-01-15 DIAGNOSIS — I10 ESSENTIAL HYPERTENSION: ICD-10-CM

## 2019-01-15 DIAGNOSIS — E11.9 CONTROLLED TYPE 2 DIABETES MELLITUS WITHOUT COMPLICATION, WITHOUT LONG-TERM CURRENT USE OF INSULIN (HCC): ICD-10-CM

## 2019-01-15 PROCEDURE — 99213 OFFICE O/P EST LOW 20 MIN: CPT | Performed by: FAMILY MEDICINE

## 2019-01-15 RX ORDER — LISINOPRIL 40 MG/1
40 TABLET ORAL DAILY
Qty: 90 TABLET | Refills: 3 | Status: SHIPPED | OUTPATIENT
Start: 2019-01-15 | End: 2019-05-24 | Stop reason: SDUPTHER

## 2019-01-15 RX ORDER — AMLODIPINE BESYLATE 5 MG/1
5 TABLET ORAL DAILY
Qty: 90 TABLET | Refills: 3 | Status: SHIPPED | OUTPATIENT
Start: 2019-01-15 | End: 2019-05-24

## 2019-01-15 NOTE — PROGRESS NOTES
HPI  Rose Degroot is a 83 y.o. female who is here for follow up of hypertension.  Overall denies any new complaints.  Reports stress related to her grandson and children living with her.  Obvious dementia again asking the same question multiple times.  Reports [employer] members have told her she has dementia and needs to talk to her doctor.  Informed patient unfortunately no medication to cure this condition etc.  Unfortunately with dementia not sure she really understands conversation or remembers.  Blood pressure is improved but remains above goal.  Lab work from one month ago fairly unremarkable including an A1c of 7.0      Review of Systems   Psychiatric/Behavioral: Positive for confusion. Negative for behavioral problems.   All other systems reviewed and are negative.        No past medical history on file.    No past surgical history on file.    No family history on file.    Social History     Socioeconomic History   • Marital status:      Spouse name: Not on file   • Number of children: Not on file   • Years of education: Not on file   • Highest education level: Not on file   Social Needs   • Financial resource strain: Not on file   • Food insecurity - worry: Not on file   • Food insecurity - inability: Not on file   • Transportation needs - medical: Not on file   • Transportation needs - non-medical: Not on file   Occupational History   • Not on file   Tobacco Use   • Smoking status: Never Smoker   • Smokeless tobacco: Never Used   Substance and Sexual Activity   • Alcohol use: No     Frequency: Never   • Drug use: No   • Sexual activity: No   Other Topics Concern   • Not on file   Social History Narrative   • Not on file         Physical Exam   Constitutional: She is oriented to person, place, and time. She appears well-developed and well-nourished.   HENT:   Head: Normocephalic.   Nose: Nose normal.   Mouth/Throat: Oropharynx is clear and moist.   Eyes: Conjunctivae and EOM are normal. Pupils are equal,  round, and reactive to light.   Neck: Normal range of motion.   Cardiovascular: Normal rate, regular rhythm and normal heart sounds.   Pulmonary/Chest: Effort normal. No respiratory distress.   Abdominal: Soft. She exhibits no distension. There is no guarding.   Musculoskeletal: Normal range of motion. She exhibits no edema or deformity.   Lymphadenopathy:     She has no cervical adenopathy.   Neurological: She is alert and oriented to person, place, and time. Coordination normal.   Skin: Skin is warm and dry.   Psychiatric: Her speech is normal and behavior is normal. Judgment and thought content normal. Her mood appears anxious. She is not actively hallucinating. She exhibits abnormal recent memory. She is attentive.   Nursing note and vitals reviewed.        Assessment/Plan    Rose was seen today for hypertension.    Diagnoses and all orders for this visit:    Late onset Alzheimer's disease without behavioral disturbance    Essential hypertension  -     amLODIPine (NORVASC) 5 MG tablet; Take 1 tablet by mouth Daily.  -     lisinopril (PRINIVIL,ZESTRIL) 40 MG tablet; Take 1 tablet by mouth Daily.    Controlled type 2 diabetes mellitus without complication, without long-term current use of insulin (CMS/[facility])      Patient is here for follow up mostly of hypertension.  Blood pressure improved but remains above goal.  Will change medication.  Continues with obvious dementia again asking same question  multiple times.  Will increase blood pressure medicine as noted above and recheck blood pressure in one month.  Also do need to update immunization records etc.    This note includes information entered using a voice recognition dictation system.  Though reviewed, some nonsensible errors may remain.

## 2019-05-24 ENCOUNTER — OFFICE VISIT (OUTPATIENT)
Dept: FAMILY MEDICINE CLINIC | Facility: CLINIC | Age: 84
End: 2019-05-24

## 2019-05-24 VITALS
TEMPERATURE: 98.3 F | RESPIRATION RATE: 16 BRPM | SYSTOLIC BLOOD PRESSURE: 150 MMHG | HEART RATE: 82 BPM | HEIGHT: 62 IN | WEIGHT: 127.4 LBS | BODY MASS INDEX: 23.45 KG/M2 | DIASTOLIC BLOOD PRESSURE: 80 MMHG | OXYGEN SATURATION: 98 %

## 2019-05-24 DIAGNOSIS — Z79.899 HIGH RISK MEDICATION USE: ICD-10-CM

## 2019-05-24 DIAGNOSIS — E53.8 B12 DEFICIENCY: ICD-10-CM

## 2019-05-24 DIAGNOSIS — F02.80 LATE ONSET ALZHEIMER'S DISEASE WITHOUT BEHAVIORAL DISTURBANCE (HCC): Primary | ICD-10-CM

## 2019-05-24 DIAGNOSIS — I10 ESSENTIAL HYPERTENSION: ICD-10-CM

## 2019-05-24 DIAGNOSIS — G30.1 LATE ONSET ALZHEIMER'S DISEASE WITHOUT BEHAVIORAL DISTURBANCE (HCC): Primary | ICD-10-CM

## 2019-05-24 DIAGNOSIS — E11.9 CONTROLLED TYPE 2 DIABETES MELLITUS WITHOUT COMPLICATION, WITHOUT LONG-TERM CURRENT USE OF INSULIN (HCC): ICD-10-CM

## 2019-05-24 DIAGNOSIS — E07.9 DISEASE OF THYROID GLAND: ICD-10-CM

## 2019-05-24 PROCEDURE — 99214 OFFICE O/P EST MOD 30 MIN: CPT | Performed by: FAMILY MEDICINE

## 2019-05-24 RX ORDER — LISINOPRIL 40 MG/1
40 TABLET ORAL DAILY
Qty: 90 TABLET | Refills: 3 | Status: SHIPPED | OUTPATIENT
Start: 2019-05-24 | End: 2019-07-11 | Stop reason: SDUPTHER

## 2019-05-24 NOTE — PROGRESS NOTES
MICHELE Degroot is a 83 y.o. female who is here for follow up of Alzheimer's dementia.  She lives with her grandson and overall status seems fairly stable.  Has discontinued all medications except for lisinopril.  Patient remains with obvious dementia and repeats same statement multiple times and asks same question multiple times etc.  Otherwise seems to be fairly functional.  Recheck blood pressure indicates adequate control.  Grandson reports she has severe cold intolerance.  Some weight loss is noted.  Most likely will be moving to [place] in a couple of months where her son is located.      Review of Systems   Constitutional: Positive for unexpected weight change.   Gastrointestinal: Positive for diarrhea.        Intermittent episodes of diarrhea which last 24 hours.   Endocrine: Positive for cold intolerance.   Neurological: Positive for dizziness.        Does have some balance issues   Psychiatric/Behavioral: Positive for agitation, confusion and decreased concentration. The patient is nervous/anxious.    All other systems reviewed and are negative.        History reviewed. No pertinent past medical history.    History reviewed. No pertinent surgical history.    History reviewed. No pertinent family history.    Social History     Socioeconomic History   • Marital status:      Spouse name: Not on file   • Number of children: Not on file   • Years of education: Not on file   • Highest education level: Not on file   Tobacco Use   • Smoking status: Never Smoker   • Smokeless tobacco: Never Used   Substance and Sexual Activity   • Alcohol use: No     Frequency: Never   • Drug use: No   • Sexual activity: No         Physical Exam   Constitutional: She is oriented to person, place, and time. She appears well-developed and well-nourished. No distress.   HENT:   Head: Normocephalic and atraumatic.   Nose: Nose normal.   Mouth/Throat: Oropharynx is clear and moist.   Eyes: Conjunctivae and EOM are normal. Pupils  are equal, round, and reactive to light.   Neck: Normal range of motion. Neck supple. No thyromegaly present.   Cardiovascular: Normal rate and regular rhythm.   Pulmonary/Chest: Effort normal and breath sounds normal. No respiratory distress.   Abdominal: Soft. She exhibits no distension. There is no tenderness.   Musculoskeletal: Normal range of motion. She exhibits no edema or deformity.   Neurological: She is alert and oriented to person, place, and time. She exhibits normal muscle tone. Coordination normal.   Skin: Skin is warm and dry.   Psychiatric: She has a normal mood and affect. Her speech is normal and behavior is normal. Thought content is not paranoid and not delusional. She does not express impulsivity or inappropriate judgment. She exhibits abnormal recent memory.   Nursing note and vitals reviewed.        Assessment/Plan    Rose was seen today for hyperlipidemia, hypertension and alzheimer's disease.    Diagnoses and all orders for this visit:    Late onset Alzheimer's disease without behavioral disturbance  -     TSH+Free T4    Essential hypertension  -     lisinopril (PRINIVIL,ZESTRIL) 40 MG tablet; Take 1 tablet by mouth Daily.  -     Comprehensive Metabolic Panel    Disease of thyroid gland  -     TSH+Free T4    Controlled type 2 diabetes mellitus without complication, without long-term current use of insulin (CMS/[facility])  -     Hemoglobin A1c    High risk medication use  -     CBC & Differential  -     Comprehensive Metabolic Panel    B12 deficiency  -     Vitamin B12        Patient with Alzheimer's dementia which actually seems fairly stable.  Most recent lab test A1c of 7 and will be repeated.  Some weight loss noted and will be followed.  Most likely will be moving to [place] in the summer and will return for follow-up visit in July prior to move to get records etc.  All medications discontinued except for lisinopril and blood pressure seems to be under adequate control.  Reports some balance  difficulties as well as cold intolerance.  Will check thyroid function as well as recheck B12 levels since has discontinued supplement.    This note includes information entered using a voice recognition dictation system.  Though reviewed, some nonsensible errors may remain.

## 2019-05-25 DIAGNOSIS — R79.89 ELEVATED LFTS: Primary | ICD-10-CM

## 2019-05-25 LAB
ALBUMIN SERPL-MCNC: 3.9 G/DL (ref 3.5–5.2)
ALBUMIN/GLOB SERPL: 1.1 G/DL
ALP SERPL-CCNC: 69 U/L (ref 39–117)
ALT SERPL-CCNC: 51 U/L (ref 1–33)
AST SERPL-CCNC: 49 U/L (ref 1–32)
BASOPHILS # BLD AUTO: 0.04 10*3/MM3 (ref 0–0.2)
BASOPHILS NFR BLD AUTO: 0.9 % (ref 0–1.5)
BILIRUB SERPL-MCNC: 0.6 MG/DL (ref 0.2–1.2)
BUN SERPL-MCNC: 9 MG/DL (ref 8–23)
BUN/CREAT SERPL: 13 (ref 7–25)
CALCIUM SERPL-MCNC: 9.2 MG/DL (ref 8.6–10.5)
CHLORIDE SERPL-SCNC: 104 MMOL/L (ref 98–107)
CO2 SERPL-SCNC: 25 MMOL/L (ref 22–29)
CREAT SERPL-MCNC: 0.69 MG/DL (ref 0.57–1)
EOSINOPHIL # BLD AUTO: 0.1 10*3/MM3 (ref 0–0.4)
EOSINOPHIL NFR BLD AUTO: 2.3 % (ref 0.3–6.2)
ERYTHROCYTE [DISTWIDTH] IN BLOOD BY AUTOMATED COUNT: 13.2 % (ref 12.3–15.4)
GLOBULIN SER CALC-MCNC: 3.4 GM/DL
GLUCOSE SERPL-MCNC: 142 MG/DL (ref 65–99)
HBA1C MFR BLD: 6.6 % (ref 4.8–5.6)
HCT VFR BLD AUTO: 38.5 % (ref 34–46.6)
HGB BLD-MCNC: 12.3 G/DL (ref 12–15.9)
IMM GRANULOCYTES # BLD AUTO: 0.01 10*3/MM3 (ref 0–0.05)
IMM GRANULOCYTES NFR BLD AUTO: 0.2 % (ref 0–0.5)
LYMPHOCYTES # BLD AUTO: 1.31 10*3/MM3 (ref 0.7–3.1)
LYMPHOCYTES NFR BLD AUTO: 30.3 % (ref 19.6–45.3)
MCH RBC QN AUTO: 29.9 PG (ref 26.6–33)
MCHC RBC AUTO-ENTMCNC: 31.9 G/DL (ref 31.5–35.7)
MCV RBC AUTO: 93.7 FL (ref 79–97)
MONOCYTES # BLD AUTO: 0.26 10*3/MM3 (ref 0.1–0.9)
MONOCYTES NFR BLD AUTO: 6 % (ref 5–12)
NEUTROPHILS # BLD AUTO: 2.61 10*3/MM3 (ref 1.7–7)
NEUTROPHILS NFR BLD AUTO: 60.3 % (ref 42.7–76)
NRBC BLD AUTO-RTO: 0.2 /100 WBC (ref 0–0.2)
PLATELET # BLD AUTO: 281 10*3/MM3 (ref 140–450)
POTASSIUM SERPL-SCNC: 4 MMOL/L (ref 3.5–5.2)
PROT SERPL-MCNC: 7.3 G/DL (ref 6–8.5)
RBC # BLD AUTO: 4.11 10*6/MM3 (ref 3.77–5.28)
SODIUM SERPL-SCNC: 142 MMOL/L (ref 136–145)
T4 FREE SERPL-MCNC: 1.31 NG/DL (ref 0.93–1.7)
TSH SERPL DL<=0.005 MIU/L-ACNC: 1.46 MIU/ML (ref 0.27–4.2)
VIT B12 SERPL-MCNC: 622 PG/ML (ref 211–946)
WBC # BLD AUTO: 4.33 10*3/MM3 (ref 3.4–10.8)

## 2019-05-28 DIAGNOSIS — R79.89 ELEVATED LFTS: Primary | ICD-10-CM

## 2019-05-29 LAB
HAV IGM SERPL QL IA: NEGATIVE
HBV CORE IGM SERPL QL IA: NEGATIVE
HBV SURFACE AG SERPL QL IA: NEGATIVE
HCV AB S/CO SERPL IA: 0.1 S/CO RATIO (ref 0–0.9)
Lab: NORMAL
WRITTEN AUTHORIZATION: NORMAL

## 2019-06-24 ENCOUNTER — OFFICE VISIT (OUTPATIENT)
Dept: FAMILY MEDICINE CLINIC | Facility: CLINIC | Age: 84
End: 2019-06-24

## 2019-06-24 VITALS
SYSTOLIC BLOOD PRESSURE: 132 MMHG | TEMPERATURE: 98.1 F | OXYGEN SATURATION: 98 % | DIASTOLIC BLOOD PRESSURE: 88 MMHG | WEIGHT: 128 LBS | HEART RATE: 62 BPM | HEIGHT: 62 IN | BODY MASS INDEX: 23.55 KG/M2

## 2019-06-24 DIAGNOSIS — I10 ESSENTIAL HYPERTENSION: Primary | ICD-10-CM

## 2019-06-24 DIAGNOSIS — G30.1 LATE ONSET ALZHEIMER'S DISEASE WITH BEHAVIORAL DISTURBANCE (HCC): ICD-10-CM

## 2019-06-24 DIAGNOSIS — F02.818 LATE ONSET ALZHEIMER'S DISEASE WITH BEHAVIORAL DISTURBANCE (HCC): ICD-10-CM

## 2019-06-24 PROCEDURE — 99213 OFFICE O/P EST LOW 20 MIN: CPT | Performed by: FAMILY MEDICINE
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
## 2019-07-03 ENCOUNTER — OFFICE VISIT (OUTPATIENT)
Dept: FAMILY MEDICINE CLINIC | Facility: CLINIC | Age: 84
End: 2019-07-03

## 2019-07-03 VITALS
HEART RATE: 60 BPM | DIASTOLIC BLOOD PRESSURE: 100 MMHG | TEMPERATURE: 98.5 F | BODY MASS INDEX: 23.37 KG/M2 | OXYGEN SATURATION: 98 % | SYSTOLIC BLOOD PRESSURE: 160 MMHG | RESPIRATION RATE: 15 BRPM | HEIGHT: 62 IN | WEIGHT: 127 LBS

## 2019-07-03 DIAGNOSIS — G30.1 LATE ONSET ALZHEIMER'S DISEASE WITH BEHAVIORAL DISTURBANCE (HCC): Primary | ICD-10-CM

## 2019-07-03 DIAGNOSIS — F02.818 LATE ONSET ALZHEIMER'S DISEASE WITH BEHAVIORAL DISTURBANCE (HCC): Primary | ICD-10-CM

## 2019-07-03 PROCEDURE — 99212 OFFICE O/P EST SF 10 MIN: CPT | Performed by: FAMILY MEDICINE

## 2019-07-03 NOTE — PROGRESS NOTES
HPI  Rose Degroot is a 83 y.o. female who is here for follow up Alzheimer's dementia.  Was recently seen and informed is not safe to live alone.  Currently lives with grandson but he is getting ready to move out of town.  The patient's son is supposedly coming to town and taking patient with him back to [facility] to continue her care.  Patient does not want to leave this area and sell her house etc.  Patient was again informed it is not safe for her to live alone.      Review of Systems   Psychiatric/Behavioral: Positive for confusion and decreased concentration.   All other systems reviewed and are negative.        No past medical history on file.    No past surgical history on file.    No family history on file.    Social History     Socioeconomic History   • Marital status:      Spouse name: Not on file   • Number of children: Not on file   • Years of education: Not on file   • Highest education level: Not on file   Tobacco Use   • Smoking status: Never Smoker   • Smokeless tobacco: Never Used   Substance and Sexual Activity   • Alcohol use: No     Frequency: Never   • Drug use: No   • Sexual activity: No         Physical Exam   Constitutional: She appears well-developed and well-nourished. No distress.   Psychiatric: She has a normal mood and affect. Her speech is normal and behavior is normal. She is not actively hallucinating. Thought content is not paranoid. She expresses impulsivity. She expresses no homicidal and no suicidal ideation. She exhibits abnormal recent memory. She is attentive.   Vitals reviewed.        Assessment/Plan    Rose was seen today for hypertension.    Diagnoses and all orders for this visit:    Late onset Alzheimer's disease with behavioral disturbance      Patient here mostly for final visit prior to grandson moving out of town.  Supposedly son is coming to town to take her to his home in [facility].  Grandson concerned and given brochure for a local assisted  care facility if necessary.  Depending on final decision assuming patient will get a new primary care physician at what ever facility she ends up in?    This note includes information entered using a voice recognition dictation system.  Though reviewed, some nonsensible errors may remain.

## 2019-07-11 ENCOUNTER — TELEPHONE (OUTPATIENT)
Dept: FAMILY MEDICINE CLINIC | Facility: CLINIC | Age: 84
End: 2019-07-11

## 2019-07-11 DIAGNOSIS — I10 ESSENTIAL HYPERTENSION: ICD-10-CM

## 2019-07-11 RX ORDER — LISINOPRIL 40 MG/1
40 TABLET ORAL DAILY
Qty: 90 TABLET | Refills: 3 | Status: SHIPPED | OUTPATIENT
Start: 2019-07-11

## 2019-07-11 NOTE — TELEPHONE ENCOUNTER
Pt son Toi is calling to discuss  medication and health before patient moves out of town . Please advise      [phone]